# Patient Record
Sex: FEMALE | Race: WHITE
[De-identification: names, ages, dates, MRNs, and addresses within clinical notes are randomized per-mention and may not be internally consistent; named-entity substitution may affect disease eponyms.]

---

## 2017-11-01 ENCOUNTER — HOSPITAL ENCOUNTER (EMERGENCY)
Dept: HOSPITAL 25 - UCCORT | Age: 22
Discharge: HOME | End: 2017-11-01
Payer: COMMERCIAL
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
## 2017-11-01 DIAGNOSIS — F12.90: ICD-10-CM
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
## 2017-11-01 DIAGNOSIS — L03.116: Primary | ICD-10-CM

## 2017-11-01 PROCEDURE — 99202 OFFICE O/P NEW SF 15 MIN: CPT

## 2017-11-01 PROCEDURE — 87205 SMEAR GRAM STAIN: CPT

## 2017-11-01 PROCEDURE — G0463 HOSPITAL OUTPT CLINIC VISIT: HCPCS

## 2017-11-01 PROCEDURE — 87186 SC STD MICRODIL/AGAR DIL: CPT

## 2017-11-01 PROCEDURE — 87077 CULTURE AEROBIC IDENTIFY: CPT

## 2017-11-01 PROCEDURE — 87070 CULTURE OTHR SPECIMN AEROBIC: CPT

## 2017-11-01 NOTE — UC
Skin Complaint HPI





- HPI Summary


HPI Summary: 





TATTOO APPLIED TO LEFT ANKLE ON 10/14/17. SOME TIME LATER 10/21/17 RECEIVED CUT 

TO LEFT ANKLE FROM UNKNOWN SOURCE. CUT HAS HAD CHALLENGING TIME HEALING. 

TENDERNESS AND REDNESS WORSENING OVER LAST FEW DAYS. NO FEVER. POSSIBLE 

DISCHARGE. NO HISTORY OF MRSA. 





- History of Current Complaint


Chief Complaint: UCSkin


Time Seen by Provider: 11/01/17 16:35


Stated Complaint: LEFT FOOT CUT BY TATTOO


Hx Obtained From: Patient


Hx Last Menstrual Period: 10/20/17


Onset/Duration: Gradual Onset, Lasting Weeks


Skin Exposure Onset/Duration: Days Ago


Onset Severity: Mild


Current Severity: Mild


Location: Discrete


Character: Redness, Painful


Aggravating Factor(s): Touch


Alleviating Factor(s): Nothing


Associated Signs & Symptoms: Positive: Drainage, Tenderness, Red Streaks.  

Negative: Fever, Chills, Cough, Wheezing, Hoarseness, Throat Tightening, Rash, 

Bruising


Related History: Trauma





- Allergy/Home Medications


Allergies/Adverse Reactions: 


 Allergies











Allergy/AdvReac Type Severity Reaction Status Date / Time


 


No Known Allergies Allergy   Verified 11/01/17 16:39














Review of Systems


Constitutional: Negative


Skin: Rash


Eyes: Negative


ENT: Negative


Respiratory: Negative


Cardiovascular: Negative


Gastrointestinal: Negative


Genitourinary: Negative


Motor: Negative


Neurovascular: Negative


Musculoskeletal: Negative


Neurological: Negative


Psychological: Negative


Is Patient Immunocompromised?: No


All Other Systems Reviewed And Are Negative: Yes





PMH/Surg Hx/FS Hx/Imm Hx


Previously Healthy: Yes





- Surgical History


Surgical History: None





- Family History


Known Family History: 


   Negative: Blood Disorder





- Social History


Occupation: Student


Lives: With Family


Alcohol Use: Weekly


Alcohol Amount: weekends


Substance Use Type: Marijuana


Substance Use Comment - Amount & Last Used: occasional


Smoking Status (MU): Never Smoked Tobacco





Physical Exam


Triage Information Reviewed: Yes


Appearance: Well-Appearing, No Pain Distress, Well-Nourished


Vital Signs: 


 Initial Vital Signs











Temp  97.6 F   11/01/17 16:35


 


Pulse  92   11/01/17 16:35


 


Resp  18   11/01/17 16:35


 


BP  123/73   11/01/17 16:35


 


Pulse Ox  100   11/01/17 16:35











Vital Signs Reviewed: Yes


Eye Exam: Normal


ENT Exam: Normal


ENT: Positive: Normal ENT inspection


Dental Exam: Normal


Neck exam: Normal


Respiratory Exam: Normal


Cardiovascular Exam: Normal


Cardiovascular: Positive: RRR, No Murmur, Pulses Normal


Abdominal Exam: Normal


Musculoskeletal Exam: Normal


Neurological Exam: Normal


Psychological Exam: Normal


Skin Exam: Normal





Course/Dx





- Differential Diagnoses - Skin Complaint


Differential Diagnoses: Cellulitis, MRSA





- Diagnoses


Provider Diagnoses: CELLULITIS LEFT ANKLE





Discharge





- Discharge Plan


Condition: Stable


Disposition: HOME


Prescriptions: 


Cephalexin CAP* [Keflex CAP*] 500 mg PO TID #21 cap


Patient Education Materials:  Cellulitis (ED)


Referrals: 


[facility] PHYSICIAN REFERRAL [Outside]

## 2017-11-03 NOTE — UC
Progress





- Progress Note


Progress Note: 





wound culture : + MRSA 





please have the pt. stop Cephalaxin 


will ERX Bactrim DS 2 x per day x 10 days

## 2018-03-06 ENCOUNTER — HOSPITAL ENCOUNTER (EMERGENCY)
Dept: HOSPITAL 25 - UCCORT | Age: 23
Discharge: HOME | End: 2018-03-06
Payer: COMMERCIAL

## 2018-03-06 VITALS — SYSTOLIC BLOOD PRESSURE: 118 MMHG | DIASTOLIC BLOOD PRESSURE: 70 MMHG

## 2018-03-06 DIAGNOSIS — J02.0: Primary | ICD-10-CM

## 2018-03-06 PROCEDURE — G0463 HOSPITAL OUTPT CLINIC VISIT: HCPCS

## 2018-03-06 PROCEDURE — 99212 OFFICE O/P EST SF 10 MIN: CPT

## 2018-03-06 PROCEDURE — 87651 STREP A DNA AMP PROBE: CPT

## 2018-03-06 NOTE — UC
Throat Pain/Nasal Luiz HPI





- HPI Summary


HPI Summary: 





SORE THROAT X 2 DAYS


+ FEVER, CHILLS, BODY ACHES


NO RUNNY NOSE, NO COUGH 





- History of Current Complaint


Chief Complaint: UCGeneralIllness


Stated Complaint: SORE THROAT,CHILLS


Time Seen by Provider: 03/06/18 13:02


Hx Obtained From: Patient


Hx Last Menstrual Period: 2months ago


Pregnant?: No


Onset/Duration: Gradual Onset, Still Present


Severity: Severe


Pain Intensity: 6


Cough: None


Associated Signs & Symptoms: Positive: Fever.  Negative: Dysphagia, Wheezing, 

Hoarseness, Sinus Discomfort, Nasal Discharge





- Allergies/Home Medications


Allergies/Adverse Reactions: 


 Allergies











Allergy/AdvReac Type Severity Reaction Status Date / Time


 


No Known Allergies Allergy   Verified 03/06/18 12:58














PMH/Surg Hx/FS Hx/Imm Hx


Previously Healthy: Yes





- Surgical History


Surgical History: None





- Family History


Known Family History: 


   Negative: Blood Disorder





- Social History


Alcohol Use: Weekly


Alcohol Amount: weekends


Substance Use Type: Marijuana


Substance Use Comment - Amount & Last Used: occasional


Smoking Status (MU): Never Smoked Tobacco





Review of Systems


Constitutional: Fever, Chills, Fatigue


Skin: Negative


Eyes: Negative


ENT: Sore Throat


Respiratory: Negative


Cardiovascular: Negative


Gastrointestinal: Negative


Is Patient Immunocompromised?: No


All Other Systems Reviewed And Are Negative: Yes





Physical Exam


Triage Information Reviewed: Yes


Appearance: Well-Appearing, No Pain Distress, Well-Nourished


Vital Signs: 


 Initial Vital Signs











Temp  99.2 F   03/06/18 12:54


 


Pulse  113   03/06/18 12:54


 


Resp  18   03/06/18 12:54


 


BP  118/70   03/06/18 12:54


 


Pulse Ox  100   03/06/18 12:54











Vital Signs Reviewed: Yes


Eyes: Positive: Conjunctiva Clear


ENT: Positive: Normal ENT inspection, Hearing grossly normal, Pharyngeal 

erythema, TMs normal, Tonsillar swelling, Tonsillar exudate.  Negative: Nasal 

congestion, Nasal drainage, Trismus


Neck: Positive: Supple, Nontender, No Lymphadenopathy


Respiratory: Positive: Chest non-tender, Lungs clear, Normal breath sounds, No 

respiratory distress


Cardiovascular: Positive: RRR, No Murmur, Pulses Normal


Abdominal Exam: Normal


Abdomen Description: Positive: Nontender, No Organomegaly, Soft


Skin Exam: Normal





Throat Pain/Nasal Course/Dx





- Differential Dx/Diagnosis


Provider Diagnoses: STREP PHARYNGITIS





Discharge





- Discharge Plan


Condition: Stable


Disposition: HOME


Prescriptions: 


Amoxicillin PO (*) [Amoxicillin 875 MG (*)] 875 mg PO BID #20 tab


Patient Education Materials:  Strep Throat (ED)


Forms:  *School Release


Referrals: 


No Primary Care Phys,NOPCP [Primary Care Provider] - If Needed

## 2018-09-07 ENCOUNTER — HOSPITAL ENCOUNTER (EMERGENCY)
Dept: HOSPITAL 25 - UCCORT | Age: 23
Discharge: HOME | End: 2018-09-07
Payer: COMMERCIAL

## 2018-09-07 VITALS — DIASTOLIC BLOOD PRESSURE: 81 MMHG | SYSTOLIC BLOOD PRESSURE: 140 MMHG

## 2018-09-07 DIAGNOSIS — N76.0: Primary | ICD-10-CM

## 2018-09-07 PROCEDURE — 87480 CANDIDA DNA DIR PROBE: CPT

## 2018-09-07 PROCEDURE — 87491 CHLMYD TRACH DNA AMP PROBE: CPT

## 2018-09-07 PROCEDURE — 87510 GARDNER VAG DNA DIR PROBE: CPT

## 2018-09-07 PROCEDURE — G0463 HOSPITAL OUTPT CLINIC VISIT: HCPCS

## 2018-09-07 PROCEDURE — 99212 OFFICE O/P EST SF 10 MIN: CPT

## 2018-09-07 PROCEDURE — 87591 N.GONORRHOEAE DNA AMP PROB: CPT

## 2018-09-07 PROCEDURE — 87661 TRICHOMONAS VAGINALIS AMPLIF: CPT

## 2018-09-07 NOTE — UC
Complaint Female HPI





- HPI Summary


HPI Summary: 





Patient states that she's had 2 bouts of a light green vaginal discharge with 

itching and odor over the past 2 days.  She denies having any lesions, 

abdominal pain or fever.  Patient is sexually active but reports she uses 

condoms.  She denies any prior history of sexually transmitted disease.





- History Of Current Complaint


Chief Complaint: UCGU


Stated Complaint: PERSONAL


Time Seen by Provider: 09/07/18 19:27


Hx Obtained From: Patient


Hx Last Menstrual Period: 8/13/18


Pain Intensity: 0


Aggravating Factor(s): Nothing


Alleviating Factor(s): Nothing


Associated Signs And Symptoms: Positive: Vaginal Discharge.  Negative: Fever





- Allergies/Home Medications


Allergies/Adverse Reactions: 


 Allergies











Allergy/AdvReac Type Severity Reaction Status Date / Time


 


No Known Allergies Allergy   Verified 09/07/18 19:05














PMH/Surg Hx/FS Hx/Imm Hx


Previously Healthy: Yes





- Surgical History


Surgical History: None





- Family History


Known Family History: Positive: None


   Negative: Blood Disorder





- Social History


Occupation: Student


Lives: Dormitory/Roommates


Alcohol Use: Weekly


Alcohol Amount: weekends 5-10


Substance Use Type: Marijuana


Substance Use Comment - Amount & Last Used: late August 2018


Smoking Status (MU): Never Smoked Tobacco





- Immunization History


Vaccination Up to Date: Yes





Review of Systems


Constitutional: Negative


Skin: Negative


Eyes: Negative


ENT: Negative


Respiratory: Negative


Cardiovascular: Negative


Gastrointestinal: Negative


Genitourinary: Vaginal/Penile Itching, Vaginal/Penile Discharge


Motor: Negative


Neurovascular: Negative


Musculoskeletal: Negative


Neurological: Negative


Psychological: Negative


Is Patient Immunocompromised?: No


All Other Systems Reviewed And Are Negative: Yes





Physical Exam


Triage Information Reviewed: Yes


Appearance: Well-Appearing


Vital Signs: 


 Initial Vital Signs











Temp  97.9 F   09/07/18 18:57


 


Pulse  102   09/07/18 18:57


 


Resp  20   09/07/18 18:57


 


BP  140/81   09/07/18 18:57


 


Pulse Ox  100   09/07/18 18:57











Vital Signs Reviewed: Yes


Eyes: Positive: Conjunctiva Clear


ENT: Positive: Normal ENT inspection


Neck: Positive: Supple, Nontender, No Lymphadenopathy


Respiratory: Positive: Lungs clear, Normal breath sounds


Cardiovascular: Positive: RRR, No Murmur


Abdomen Description: Positive: Nontender, No Organomegaly, Soft.  Negative: 

Distended, Guarding


Bowel Sounds: Positive: Present


Pelvic Exam: Positive: Other - External exam is without lesions.  Speculum exam

: Thick white material in the vaginal vault but no odor.  Cervix is closed.  

Cultures obtained.  No adnexal or uterine tenderness on bimanual exam and no 

cervical motion tenderness.


Musculoskeletal: Positive: ROM Intact


Neurological: Positive: Alert


Psychological: Positive: Age Appropriate Behavior


Skin Exam: Normal





 Complaint Female Dx





- Course


Course Of Treatment: No acute abdomen.  We will treat presumptively for yeast 

vaginitis and pelvic cultures are pending.





- Differential Dx/Diagnosis


Provider Diagnoses: Vaginitis





Discharge





- Sign-Out/Discharge


Documenting (check all that apply): Patient Departure


All imaging exams completed and their final reports reviewed: No Studies





- Discharge Plan


Condition: Stable


Disposition: HOME


Prescriptions: 


Fluconazole 150 MG (NF) [Diflucan 150 mg (NF)] 150 mg PO ONCE #1 tab


Patient Education Materials:  Vaginitis (ED)


Referrals: 


[facility] [Outside] - 7 Days





- Billing Disposition and Condition


Condition: STABLE


Disposition: Home

## 2018-10-21 ENCOUNTER — HOSPITAL ENCOUNTER (EMERGENCY)
Dept: HOSPITAL 25 - UCCORT | Age: 23
Discharge: HOME | End: 2018-10-21
Payer: COMMERCIAL

## 2018-10-21 VITALS — DIASTOLIC BLOOD PRESSURE: 74 MMHG | SYSTOLIC BLOOD PRESSURE: 127 MMHG

## 2018-10-21 DIAGNOSIS — F12.90: ICD-10-CM

## 2018-10-21 DIAGNOSIS — H65.91: Primary | ICD-10-CM

## 2018-10-21 PROCEDURE — G0463 HOSPITAL OUTPT CLINIC VISIT: HCPCS

## 2018-10-21 PROCEDURE — 99212 OFFICE O/P EST SF 10 MIN: CPT

## 2018-10-21 NOTE — UC
Ear Complaint HPI





- HPI Summary


HPI Summary: 





Pt c/o right ear pain that began a "few days after" having a "cold" X 1 week. . 





- History of Current Complaint


Chief Complaint: UCEar


Stated Complaint: R EAR PAIN


Time Seen by Provider: 10/21/18 09:19


Hx Obtained From: Patient


Hx Last Menstrual Period: 9/16/18


Pregnant?: No


Onset/Duration: Gradual Onset


Severity Initially: Mild


Severity Currently: Moderate


Pain Intensity: 7


Associated Signs/Symptoms: Positive: URI Symptoms





- Allergies/Home Medications


Allergies/Adverse Reactions: 


 Allergies











Allergy/AdvReac Type Severity Reaction Status Date / Time


 


No Known Allergies Allergy   Verified 09/07/18 19:05














PMH/Surg Hx/FS Hx/Imm Hx


Previously Healthy: Yes





- Surgical History


Surgical History: None





- Family History


Known Family History: Positive: None


   Negative: Blood Disorder





- Social History


Occupation: Student


Lives: With Family


Alcohol Use: Weekly


Alcohol Amount: weekends 5-10


Substance Use Type: Marijuana


Substance Use Comment - Amount & Last Used: 10/19/18


Smoking Status (MU): Never Smoked Tobacco


Have You Smoked in the Last Year: No





- Immunization History


Vaccination Up to Date: Yes





Review of Systems


Constitutional: Negative


Skin: Negative


Eyes: Negative


ENT: Ear Ache, Sinus Congestion


Respiratory: Cough


Cardiovascular: Negative


Gastrointestinal: Negative


Genitourinary: Negative


Motor: Negative


Neurovascular: Negative


Musculoskeletal: Negative


Neurological: Negative


Psychological: Negative


Is Patient Immunocompromised?: No


All Other Systems Reviewed And Are Negative: Yes





Physical Exam


Triage Information Reviewed: Yes


Appearance: Well-Appearing


Vital Signs: 


 Initial Vital Signs











Temp  98.9 F   10/21/18 09:30


 


Pulse  81   10/21/18 09:30


 


Resp  20   10/21/18 09:30


 


BP  127/74   10/21/18 09:30


 


Pulse Ox  100   10/21/18 09:30











Vital Signs Reviewed: Yes


Eye Exam: Normal


ENT Exam: Other


ENT: Positive: Nasal congestion, TM bulging - right TM, small amount of fluid 

leaking through right TM. Clear fluid, TM is shiny and clear


Dental Exam: Normal


Neck exam: Normal


Respiratory Exam: Normal


Cardiovascular Exam: Normal


Musculoskeletal Exam: Normal


Neurological Exam: Normal


Psychological Exam: Normal


Skin Exam: Normal





Ear Complaint Course/Dx





- Differential Dx/Diagnosis


Differential Diagnosis/HQI/PQRI: Cerumen Impaction, Otitis Media, Perforated TM


Provider Diagnoses: right serous otitis media





Discharge





- Sign-Out/Discharge


Documenting (check all that apply): Patient Departure


All imaging exams completed and their final reports reviewed: No Studies





- Discharge Plan


Condition: Stable


Disposition: HOME


Prescriptions: 


Pseudoephedrine TAB* [Sudafed TAB*] 60 mg PO Q12H #14 tab


Patient Education Materials:  Serous Otitis Media (ED)


Referrals: 


[facility] [Outside] - If Needed


No Primary Care Phys,NOPCP [Primary Care Provider] - 





- Billing Disposition and Condition


Condition: STABLE


Disposition: Home

## 2019-03-13 ENCOUNTER — HOSPITAL ENCOUNTER (EMERGENCY)
Dept: HOSPITAL 25 - UCCORT | Age: 24
Discharge: HOME | End: 2019-03-13
Payer: COMMERCIAL

## 2019-03-13 VITALS — SYSTOLIC BLOOD PRESSURE: 131 MMHG | DIASTOLIC BLOOD PRESSURE: 71 MMHG

## 2019-03-13 DIAGNOSIS — H00.014: Primary | ICD-10-CM

## 2019-03-13 PROCEDURE — G0463 HOSPITAL OUTPT CLINIC VISIT: HCPCS

## 2019-03-13 PROCEDURE — 99211 OFF/OP EST MAY X REQ PHY/QHP: CPT

## 2019-03-13 NOTE — UC
Eye Complaint HPI





- HPI Summary


HPI Summary: 





This is a patient who experienced mild left upper eyelid swelling since 

yesterday.  She states this morning it was increase in swelling.  She's had no 

recent illness.  Did take Benadryl at home without improvement.





- History of Current Complaint


Chief Complaint: UCEye


Stated Complaint: LEFT EYE COMPLAINT


Time Seen by Provider: 03/13/19 09:57


Hx Obtained From: Patient


Hx Last Menstrual Period: 2/2019


Pregnant?: No


Onset/Duration: Gradual Onset


Timing: Constant


Severity Initially: Mild


Severity Currently: Mild


Pain Intensity: 0


Location of Injury: Eye Lid (upper) - Patient denies pain to the area only 

swelling of the upper eyelid


Aggravating Factor(s): Nothing


Alleviating Factor(s): Nothing


Associated Signs And Symptoms: Positive: Negative - Is no loss





- Risk Factors


Penetrating Injury Risk Factor: Negative


Globe Rupture Risk Factors: Negative


Acute Glaucoma Risk Factors: Negative





- Allergies/Home Medications


Allergies/Adverse Reactions: 


 Allergies











Allergy/AdvReac Type Severity Reaction Status Date / Time


 


No Known Allergies Allergy   Verified 03/13/19 08:56











Home Medications: 


 Home Medications





diPHENhydraMINE PO* [Benadryl PO 25 MG TAB*] 25 mg PO Q6H PRN 03/13/19 [History 

Confirmed 03/13/19]











PMH/Surg Hx/FS Hx/Imm Hx


Previously Healthy: Yes





- Surgical History


Surgical History: None





- Family History


Known Family History: Positive: None


   Negative: Blood Disorder





- Social History


Occupation: Student


Lives: Dormitory/Roommates


Alcohol Use: Weekly


Alcohol Amount: weekends


Substance Use Type: None


Substance Use Comment - Amount & Last Used: 10/19/18


Smoking Status (MU): Never Smoked Tobacco


Have You Smoked in the Last Year: No





- Immunization History


Vaccination Up to Date: Yes





Review of Systems


All Other Systems Reviewed And Are Negative: Yes


Constitutional: Positive: Negative


Skin: Positive: Negative


Eyes: Positive: Other - Left upper eyelid is mildly edematous but has not had 

any redness to the area.  The patient states her vision is normal.  She does 

not wear contact lenses.  No drainage from her eyes.


ENT: Positive: Negative


Respiratory: Positive: Negative


Cardiovascular: Positive: Negative


Gastrointestinal: Positive: Negative


Genitourinary: Positive: Negative





Physical Exam


Triage Information Reviewed: Yes


Appearance: Well-Appearing, No Pain Distress, Well-Nourished


Vital Signs: 


 Initial Vital Signs











Temp  98.0 F   03/13/19 08:57


 


Pulse  83   03/13/19 08:57


 


Resp  15   03/13/19 08:57


 


BP  131/71   03/13/19 08:57


 


Pulse Ox  100   03/13/19 08:57











Vital Signs Reviewed: Yes


Eye Exam: Normal


Eyes: Positive: Conjunctiva Clear.  Negative: Discharge - PERRRLA EOMI


ENT Exam: Normal


Neck exam: Normal


Respiratory Exam: Normal


Cardiovascular Exam: Normal


Musculoskeletal Exam: Normal


Neurological Exam: Normal


Psychological Exam: Normal


Skin: Positive: Other - Left upper eyelid is mildly edematous, no erythema, no 

drainage.  Underneath the left upper eyelid there is one small papule however 

it is not raised it is surrounded by mild erythema.  At this point in time I 

believe it may be an early stye.  It does not look ulcerated presently.





Eye Complaint Course/Dx





- Course


Course Of Treatment: She has been comfortable here.  I advised her to follow up 

at the [facility] for recheck in 24-48 hours if any worsening 

symptoms.





- Differential Dx/Diagnosis


Differential Diagnosis/HQI/PQRI: Other - Stye


Provider Diagnosis: 


 Stye








Discharge





- Sign-Out/Discharge


Documenting (check all that apply): Patient Departure


All imaging exams completed and their final reports reviewed: No Studies





- Discharge Plan


Condition: Good


Disposition: HOME


Patient Education Materials:  Stye (ED)


Forms:  *School Release


Referrals: 


No Primary Care Phys,NOPCP [Primary Care Provider] - 


SHAUN [facility]HETA [[employer], APPLICATION, OTHER] - 


Additional Instructions: 


Apply cold co,presses to eyelid several times during the day. Follow up at the 

[facility] if no improvement in 2-3 days





- Billing Disposition and Condition


Condition: GOOD


Disposition: Home

## 2019-04-28 ENCOUNTER — HOSPITAL ENCOUNTER (EMERGENCY)
Dept: HOSPITAL 25 - UCCORT | Age: 24
Discharge: HOME | End: 2019-04-28
Payer: COMMERCIAL

## 2019-04-28 VITALS — SYSTOLIC BLOOD PRESSURE: 122 MMHG | DIASTOLIC BLOOD PRESSURE: 68 MMHG

## 2019-04-28 DIAGNOSIS — J02.9: Primary | ICD-10-CM

## 2019-04-28 DIAGNOSIS — K13.79: ICD-10-CM

## 2019-04-28 PROCEDURE — 87651 STREP A DNA AMP PROBE: CPT

## 2019-04-28 PROCEDURE — 99212 OFFICE O/P EST SF 10 MIN: CPT

## 2019-04-28 PROCEDURE — G0463 HOSPITAL OUTPT CLINIC VISIT: HCPCS

## 2019-04-28 NOTE — UC
Throat Pain/Nasal Luiz HPI





- HPI Summary


HPI Summary: 





Pt c/o sudden onset of ST, blisters on soft palate and white spots on tosils. 





- History of Current Complaint


Chief Complaint: UCGeneralIllness


Stated Complaint: SORE THROAT


Time Seen by Provider: 04/28/19 15:39


Hx Obtained From: Patient


Hx Last Menstrual Period: approx 1 wk ago.


Pregnant?: No


Onset/Duration: Sudden Onset


Severity: Moderate


Pain Intensity: 6


Cough: None


Associated Signs & Symptoms: Positive: Dysphagia





- Epiglottits Risk Factors


Epiglottis Risk Factors: Sudden Onset





- Allergies/Home Medications


Allergies/Adverse Reactions: 


 Allergies











Allergy/AdvReac Type Severity Reaction Status Date / Time


 


No Known Allergies Allergy   Verified 04/28/19 15:31














PMH/Surg Hx/FS Hx/Imm Hx


Previously Healthy: Yes





- Surgical History


Surgical History: None





- Family History


Known Family History: Positive: Cardiac Disease


   Negative: Blood Disorder





- Social History


Occupation: Student


Lives: Dormitory/Roommates


Alcohol Use: Weekly


Alcohol Amount: weekends


Substance Use Type: None


Substance Use Comment - Amount & Last Used: 10/19/18


Smoking Status (MU): Never Smoked Tobacco


Have You Smoked in the Last Year: No





- Immunization History


Vaccination Up to Date: Yes





Review of Systems


All Other Systems Reviewed And Are Negative: Yes


Constitutional: Positive: Negative


Skin: Positive: Negative


Eyes: Positive: Negative


ENT: Positive: Sore Throat


Respiratory: Positive: Negative


Cardiovascular: Positive: Negative


Gastrointestinal: Positive: Negative


Genitourinary: Positive: Negative


Motor: Positive: Negative


Neurovascular: Positive: Negative


Musculoskeletal: Positive: Negative


Neurological: Positive: Negative


Psychological: Positive: Negative


Is Patient Immunocompromised?: No





Physical Exam


Triage Information Reviewed: Yes


Appearance: Well-Appearing


Vital Signs: 


 Initial Vital Signs











Temp  98.2 F   04/28/19 15:32


 


Pulse  98   04/28/19 15:32


 


Resp  16   04/28/19 15:32


 


BP  122/68   04/28/19 15:32


 


Pulse Ox  98   04/28/19 15:32











Vital Signs Reviewed: Yes


Eye Exam: Normal


ENT: Positive: Tonsillar exudate


Dental Exam: Normal


Neck exam: Normal


Respiratory Exam: Normal





Throat Pain/Nasal Course/Dx





- Differential Dx/Diagnosis


Provider Diagnosis: 


 Mouth ulcers, Sore throat (viral)








Discharge





- Sign-Out/Discharge


Documenting (check all that apply): Patient Departure


All imaging exams completed and their final reports reviewed: No Studies





- Discharge Plan


Condition: Stable


Disposition: HOME


Prescriptions: 


ValACYclovir (*) [Valtrex 1 GM(*)] 1 gm PO Q12H #14 tab


Patient Education Materials:  Oral Mucositis (ED)


Referrals: 


[facility] PHYSICIAN REFERRAL [Outside] - If Needed


No Primary Care Phys,NOPCP [Primary Care Provider] - 





- Billing Disposition and Condition


Condition: STABLE


Disposition: Home

## 2019-11-09 ENCOUNTER — HOSPITAL ENCOUNTER (EMERGENCY)
Dept: HOSPITAL 25 - UCCORT | Age: 24
Discharge: HOME | End: 2019-11-09
Payer: COMMERCIAL

## 2019-11-09 VITALS — DIASTOLIC BLOOD PRESSURE: 72 MMHG | SYSTOLIC BLOOD PRESSURE: 119 MMHG

## 2019-11-09 DIAGNOSIS — H02.881: Primary | ICD-10-CM

## 2019-11-09 PROCEDURE — 99211 OFF/OP EST MAY X REQ PHY/QHP: CPT

## 2019-11-09 PROCEDURE — G0463 HOSPITAL OUTPT CLINIC VISIT: HCPCS

## 2019-11-09 NOTE — UC
Eye Complaint HPI





- HPI Summary


HPI Summary: 





Pt presents with c/o sudden onset of right eye swelling and redness that began 

this morning after taking a shower .  Pt states that right upper eye lid was 

swollen and then spontaneously resolved after applying warm and cold pack to 

eye prior to arrival.  pt denies pain, swelling or drainage at time of PE





- History of Current Complaint


Chief Complaint: UCEye


Stated Complaint: RT EYE CONCERN


Time Seen by Provider: 11/09/19 14:54


Hx Obtained From: Patient


Hx Last Menstrual Period: 11/6/19


Pregnant?: No


Onset/Duration: Sudden Onset, Resolved


Severity Initially: Mild


Severity Currently: None


Pain Intensity: 0


Aggravating Factor(s): Nothing


Alleviating Factor(s): Nothing


Associated Signs And Symptoms: Positive: Swelling





- Risk Factors


Penetrating Injury Risk Factor: Negative


Globe Rupture Risk Factors: Negative


Acute Glaucoma Risk Factors: Negative


Optic Artery Occlusion Risk Factors: Negative





- Allergies/Home Medications


Allergies/Adverse Reactions: 


 Allergies











Allergy/AdvReac Type Severity Reaction Status Date / Time


 


No Known Allergies Allergy   Verified 11/09/19 14:44














PMH/Surg Hx/FS Hx/Imm Hx


Previously Healthy: Yes





- Surgical History


Surgical History: None





- Family History


Known Family History: Positive: Cardiac Disease


   Negative: Blood Disorder





- Social History


Occupation: Student - [employer]


Lives: Dormitory/Roommates


Alcohol Use: Weekly


Alcohol Amount: weekends


Substance Use Type: None


Substance Use Comment - Amount & Last Used: 10/19/18


Smoking Status (MU): Never Smoked Tobacco


Have You Smoked in the Last Year: No





- Immunization History


Vaccination Up to Date: Yes





Review of Systems


All Other Systems Reviewed And Are Negative: Yes


Constitutional: Positive: Negative


Skin: Positive: Negative


Eyes: Positive: Eye Redness, Other - swelling of right upper eyelid that has 

resolved


ENT: Positive: Negative


Respiratory: Positive: Negative


Cardiovascular: Positive: Negative


Gastrointestinal: Positive: Negative


Genitourinary: Positive: Negative


Motor: Positive: Negative


Neurovascular: Positive: Negative


Musculoskeletal: Positive: Negative


Neurological: Positive: Negative


Psychological: Positive: Negative


Is Patient Immunocompromised?: No





Physical Exam


Triage Information Reviewed: Yes


Appearance: Well-Appearing


Vital Signs: 


 Initial Vital Signs











Temp  97.9 F   11/09/19 14:40


 


Pulse  75   11/09/19 14:40


 


Resp  16   11/09/19 14:40


 


BP  119/72   11/09/19 14:40


 


Pulse Ox  100   11/09/19 14:40











Vital Signs Reviewed: Yes


Eye Exam: Normal


Eyes: Positive: Conjunctiva Clear, Other: - no swelling of upper or lower eye 

lid bilateral


ENT Exam: Normal


Dental Exam: Normal


Neck exam: Normal


Respiratory Exam: Normal


Musculoskeletal Exam: Normal


Neurological Exam: Normal


Psychological Exam: Normal


Skin Exam: Normal





Eye Complaint Course/Dx





- Differential Dx/Diagnosis


Differential Diagnosis/HQI/PQRI: Other - eye lid swelling


Provider Diagnosis: 


 Meibomian gland dysfunction (MGD) of right upper eyelid








Discharge ED





- Sign-Out/Discharge


Documenting (check all that apply): Patient Departure


All imaging exams completed and their final reports reviewed: No Studies





- Discharge Plan


Condition: Stable


Disposition: HOME


Patient Education Materials:  Blepharitis (ED)


Referrals: 


[facility] PHYSICIAN REFERRAL [Outside] - If Needed


No Primary Care Phys,NOPCP [Primary Care Provider] - 





- Billing Disposition and Condition


Condition: STABLE


Disposition: Home

## 2021-04-10 ENCOUNTER — TRANSCRIPTION ENCOUNTER (OUTPATIENT)
Age: 26
End: 2021-04-10

## 2022-01-20 ENCOUNTER — RESULT REVIEW (OUTPATIENT)
Age: 27
End: 2022-01-20